# Patient Record
Sex: MALE | Race: WHITE | NOT HISPANIC OR LATINO | Employment: OTHER | URBAN - METROPOLITAN AREA
[De-identification: names, ages, dates, MRNs, and addresses within clinical notes are randomized per-mention and may not be internally consistent; named-entity substitution may affect disease eponyms.]

---

## 2021-12-01 ENCOUNTER — IMMUNIZATION (OUTPATIENT)
Dept: PRIMARY CARE CLINIC | Facility: CLINIC | Age: 66
End: 2021-12-01
Payer: MEDICARE

## 2021-12-01 DIAGNOSIS — Z23 NEED FOR VACCINATION: Primary | ICD-10-CM

## 2021-12-01 PROCEDURE — 0064A COVID-19, MRNA, LNP-S, PF, 100 MCG/0.25 ML DOSE VACCINE (MODERNA BOOSTER): CPT | Mod: S$GLB,,, | Performed by: FAMILY MEDICINE

## 2021-12-01 PROCEDURE — 91306 COVID-19, MRNA, LNP-S, PF, 100 MCG/0.25 ML DOSE VACCINE (MODERNA BOOSTER): ICD-10-PCS | Mod: S$GLB,,, | Performed by: FAMILY MEDICINE

## 2021-12-01 PROCEDURE — 91306 COVID-19, MRNA, LNP-S, PF, 100 MCG/0.25 ML DOSE VACCINE (MODERNA BOOSTER): CPT | Mod: S$GLB,,, | Performed by: FAMILY MEDICINE

## 2021-12-01 PROCEDURE — 0064A COVID-19, MRNA, LNP-S, PF, 100 MCG/0.25 ML DOSE VACCINE (MODERNA BOOSTER): ICD-10-PCS | Mod: S$GLB,,, | Performed by: FAMILY MEDICINE

## 2022-06-15 ENCOUNTER — IMMUNIZATION (OUTPATIENT)
Dept: PHARMACY | Facility: CLINIC | Age: 67
End: 2022-06-15
Payer: MEDICARE

## 2022-06-15 DIAGNOSIS — Z23 NEED FOR VACCINATION: Primary | ICD-10-CM

## 2022-10-31 ENCOUNTER — IMMUNIZATION (OUTPATIENT)
Dept: PHARMACY | Facility: CLINIC | Age: 67
End: 2022-10-31
Payer: MEDICARE

## 2022-10-31 DIAGNOSIS — Z23 NEED FOR VACCINATION: Primary | ICD-10-CM

## 2023-05-26 ENCOUNTER — IMMUNIZATION (OUTPATIENT)
Dept: PHARMACY | Facility: CLINIC | Age: 68
End: 2023-05-26
Payer: MEDICARE

## 2023-05-26 DIAGNOSIS — Z23 NEED FOR VACCINATION: Primary | ICD-10-CM

## 2024-01-24 PROBLEM — J30.9 ALLERGIC SINUSITIS: Status: ACTIVE | Noted: 2024-01-24

## 2024-01-24 PROBLEM — L30.9 ECZEMA: Status: ACTIVE | Noted: 2024-01-24

## 2024-01-24 PROBLEM — Z82.49 FAMILY HISTORY OF EARLY CAD: Status: ACTIVE | Noted: 2024-01-24

## 2024-01-24 PROBLEM — Z85.828 HISTORY OF BASAL CELL CARCINOMA (BCC) OF SKIN: Status: ACTIVE | Noted: 2024-01-24

## 2024-01-24 PROBLEM — Z86.010 PERSONAL HISTORY OF COLONIC POLYPS: Status: ACTIVE | Noted: 2024-01-24

## 2024-01-24 PROBLEM — Z86.0100 PERSONAL HISTORY OF COLONIC POLYPS: Status: ACTIVE | Noted: 2024-01-24

## 2024-01-24 PROBLEM — E78.00 HYPERCHOLESTEREMIA: Status: ACTIVE | Noted: 2024-01-24

## 2024-06-10 PROBLEM — Z82.49 FAMILY HISTORY OF EARLY CAD: Status: RESOLVED | Noted: 2024-01-24 | Resolved: 2024-06-10

## 2024-09-02 PROBLEM — J30.9 ALLERGIC SINUSITIS: Status: RESOLVED | Noted: 2024-01-24 | Resolved: 2024-09-02

## 2024-09-02 PROBLEM — Z85.828 HISTORY OF BASAL CELL CARCINOMA (BCC) OF SKIN: Status: RESOLVED | Noted: 2024-01-24 | Resolved: 2024-09-02

## 2024-09-02 PROBLEM — Z86.0100 PERSONAL HISTORY OF COLONIC POLYPS: Status: RESOLVED | Noted: 2024-01-24 | Resolved: 2024-09-02

## 2025-02-03 ENCOUNTER — CLINICAL SUPPORT (OUTPATIENT)
Dept: AUDIOLOGY | Facility: CLINIC | Age: 70
End: 2025-02-03
Payer: MEDICARE

## 2025-02-03 ENCOUNTER — OFFICE VISIT (OUTPATIENT)
Dept: OTOLARYNGOLOGY | Facility: CLINIC | Age: 70
End: 2025-02-03
Payer: MEDICARE

## 2025-02-03 VITALS
BODY MASS INDEX: 23.86 KG/M2 | WEIGHT: 161.63 LBS | DIASTOLIC BLOOD PRESSURE: 69 MMHG | SYSTOLIC BLOOD PRESSURE: 130 MMHG

## 2025-02-03 DIAGNOSIS — H93.12 TINNITUS, LEFT EAR: ICD-10-CM

## 2025-02-03 DIAGNOSIS — R42 DYSEQUILIBRIUM: Primary | ICD-10-CM

## 2025-02-03 DIAGNOSIS — H90.3 ASYMMETRICAL SENSORINEURAL HEARING LOSS: ICD-10-CM

## 2025-02-03 DIAGNOSIS — H93.8X2 SENSATION OF FULLNESS IN EAR, LEFT: ICD-10-CM

## 2025-02-03 DIAGNOSIS — H93.8X2 FULLNESS IN EAR, LEFT: Primary | ICD-10-CM

## 2025-02-03 PROCEDURE — 99999 PR PBB SHADOW E&M-EST. PATIENT-LVL III: CPT | Mod: PBBFAC,,, | Performed by: NURSE PRACTITIONER

## 2025-02-03 PROCEDURE — 92557 COMPREHENSIVE HEARING TEST: CPT | Mod: PBBFAC,PO | Performed by: AUDIOLOGIST-HEARING AID FITTER

## 2025-02-03 PROCEDURE — 99213 OFFICE O/P EST LOW 20 MIN: CPT | Mod: PBBFAC,27,PO,25 | Performed by: NURSE PRACTITIONER

## 2025-02-03 PROCEDURE — 99211 OFF/OP EST MAY X REQ PHY/QHP: CPT | Mod: PBBFAC,PO,25 | Performed by: AUDIOLOGIST-HEARING AID FITTER

## 2025-02-03 PROCEDURE — 92567 TYMPANOMETRY: CPT | Mod: PBBFAC,PO | Performed by: AUDIOLOGIST-HEARING AID FITTER

## 2025-02-03 PROCEDURE — 99999 PR PBB SHADOW E&M-EST. PATIENT-LVL I: CPT | Mod: PBBFAC,,, | Performed by: AUDIOLOGIST-HEARING AID FITTER

## 2025-02-03 PROCEDURE — 99204 OFFICE O/P NEW MOD 45 MIN: CPT | Mod: S$PBB,,, | Performed by: NURSE PRACTITIONER

## 2025-02-03 NOTE — PROGRESS NOTES
Subjective     Patient ID: José Miguel Carlos III is a 69 y.o. male.    Chief Complaint: Ear Fullness (Pt states he has always had problems clearing his ears/ making them pop) and Dizziness    Ear Fullness     Patient is new to ENT, referred by Dr. Smith for consultation for dizziness. Patient saw Dr. Smith 7 months ago for aural fullness and dizziness; given Singulair, Mucinex, and referred to ENT. Pertinent PMH includes carotid stenosis, Vit D deficiency, cataracts. Patient reports first dizzy episode occurred 3-4 years ago, and lasted approx 6 hours. Second dizzy episode occurred in June 2024, and lasted 6-8 hours. Third and most recent dizzy episode occurred in November, and lasted approx 6-8 hours. All three episodes were associated with ear fullness (not sure if just left ear or both ears were involved during the June 2024 episode, but patient is certain that only left ear fullness occurred during November episode). Patient has difficulty describing whether his dysequilibrium is true vertigo or not. When given the description of vertigo, patient unable to say whether or not he is experiencing rotational spinning sensation, only that it feels like he has to hold onto something or sit down. Patient reports some crackling noises in his left ear which he attributes to possible middle ear fluid secondary to his sinuses/allergies. Dizziness is not positional. Denies any fluctuant hearing loss during episodes of dizziness. Denies any unilateral tinnitus during dizziness. Denies any headaches or otalgia.     Review of Systems   Constitutional: Negative.    HENT: Negative.     Eyes: Negative.    Respiratory: Negative.     Cardiovascular: Negative.    Gastrointestinal: Negative.    Musculoskeletal: Negative.    Integumentary:  Negative.   Neurological:  Positive for dizziness.   Hematological: Negative.    Psychiatric/Behavioral: Negative.            Objective     Physical Exam  Vitals and nursing note reviewed.    Constitutional:       General: He is not in acute distress.     Appearance: He is well-developed. He is not ill-appearing.   HENT:      Head: Normocephalic and atraumatic.      Right Ear: Hearing, tympanic membrane, ear canal and external ear normal. No middle ear effusion. Tympanic membrane is not erythematous.      Left Ear: Hearing, tympanic membrane, ear canal and external ear normal.  No middle ear effusion. Tympanic membrane is not erythematous.      Nose: Nose normal.   Eyes:      General: Lids are normal. No scleral icterus.        Right eye: No discharge.         Left eye: No discharge.   Neck:      Trachea: Trachea normal. No tracheal deviation.   Cardiovascular:      Rate and Rhythm: Normal rate.   Pulmonary:      Effort: Pulmonary effort is normal. No respiratory distress.      Breath sounds: No stridor. No wheezing.   Musculoskeletal:         General: Normal range of motion.      Cervical back: Normal range of motion.   Skin:     General: Skin is warm and dry.   Neurological:      Mental Status: He is alert and oriented to person, place, and time.      Coordination: Coordination is intact.      Gait: Gait normal.   Psychiatric:         Attention and Perception: Attention normal.         Mood and Affect: Mood normal.         Speech: Speech normal.         Behavior: Behavior normal. Behavior is cooperative.     Negative Hardy-Hallpike AU       Assessment and Plan     1. Dysequilibrium    2. Sensation of fullness in ear, left    3. Asymmetrical sensorineural hearing loss    Recommend MRI brain with gadolinium per IAC protocol for asymmetrical sensorineural hearing loss to rule out retrocochlear pathology: acoustic schwannoma or cerebellar pontine angle mass. Patient agrees to call me if he desires to proceed with MRI.  Discussion regarding dysequilibrium versus true vertigo: VNG is an inner ear test as part of comprehensive vestibular system diagnostic testing, indicated for those patients experiencing  true vertigo but it is of little to no yield unless vertiginous. No evidence of any BPPV at this time. Whereas dysequilibrium is typically multifactorial: anemia, anxiety, dehydration, hypo-/hyper-glycemia, hypo-/hyper-tension, thyroid, arrhthymia, side effects of medication, gait/mobility issues, age, headaches and other neurological issues, etc. Discuss with your PCP to determine stages of workup. Patient agrees to call me for any vertigo or if patient desires to proceed with VNG.          No follow-ups on file.

## 2025-02-03 NOTE — PROGRESS NOTES
José Miguel Carlos III was seen on 02/03/2025 for an audiological evaluation. Pt was alone during today's visit. Pertinent complaints today include ear fullness and tinnitus AS. 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      Results reveal a normal-to-moderate sensorineural hearing loss for the right ear and  normal-to-moderately severe sensorineural hearing loss for the left ear.    Speech Reception Thresholds were  10 dBHL for the right ear and 25 dBHL for the left ear.    Word recognition scores were excellent for the right ear and good for the left ear.   Tympanograms were Type A for the right ear and Type A for the left ear.    Audiogram results were reviewed in detail with patient and all questions were answered. Results will be reviewed by the referring provider at the completion of this note. All complaints were addressed during this visit to the patient's satisfaction. Recommend further medical evaluation with an ENT provider for the asymmetry, AS>AD, left ear amplification pending medical clearance, repeat hearing testing in one year due to asymmetry and bilateral hearing protection with either muffs or in-ear protection in loud noises. Plan of care was discussed in detail with the patient, who agreed with the plan as above.

## 2025-02-04 DIAGNOSIS — Z01.812 PRE-PROCEDURE LAB EXAM: Primary | ICD-10-CM

## 2025-02-04 DIAGNOSIS — H90.42 SENSORINEURAL HEARING LOSS (SNHL) OF LEFT EAR WITH UNRESTRICTED HEARING OF RIGHT EAR: Primary | ICD-10-CM

## 2025-02-20 ENCOUNTER — HOSPITAL ENCOUNTER (OUTPATIENT)
Dept: RADIOLOGY | Facility: HOSPITAL | Age: 70
Discharge: HOME | End: 2025-02-20
Attending: NURSE PRACTITIONER
Payer: MEDICARE

## 2025-02-20 ENCOUNTER — RESULTS FOLLOW-UP (OUTPATIENT)
Dept: OTOLARYNGOLOGY | Facility: CLINIC | Age: 70
End: 2025-02-20
Payer: MEDICARE

## 2025-02-20 DIAGNOSIS — H90.42 SENSORINEURAL HEARING LOSS (SNHL) OF LEFT EAR WITH UNRESTRICTED HEARING OF RIGHT EAR: ICD-10-CM

## 2025-02-20 PROCEDURE — A9585 GADOBUTROL INJECTION: HCPCS | Mod: PO | Performed by: NURSE PRACTITIONER

## 2025-02-20 PROCEDURE — 70553 MRI BRAIN STEM W/O & W/DYE: CPT | Mod: TC,PO

## 2025-02-20 PROCEDURE — 25500020 PHARM REV CODE 255: Mod: PO | Performed by: NURSE PRACTITIONER

## 2025-02-20 RX ORDER — GADOBUTROL 604.72 MG/ML
7 INJECTION INTRAVENOUS
Status: COMPLETED | OUTPATIENT
Start: 2025-02-20 | End: 2025-02-20

## 2025-02-20 RX ADMIN — GADOBUTROL 7 ML: 604.72 INJECTION INTRAVENOUS at 02:02

## 2025-02-21 NOTE — TELEPHONE ENCOUNTER
or Della could someone reach out to this pt regarding some followup questions on hearing Aids.  Thanks

## 2025-03-05 ENCOUNTER — TELEPHONE (OUTPATIENT)
Dept: AUDIOLOGY | Facility: CLINIC | Age: 70
End: 2025-03-05
Payer: MEDICARE

## 2025-03-05 DIAGNOSIS — R42 DYSEQUILIBRIUM: Primary | ICD-10-CM

## 2025-04-10 ENCOUNTER — TELEPHONE (OUTPATIENT)
Dept: AUDIOLOGY | Facility: CLINIC | Age: 70
End: 2025-04-10
Payer: MEDICARE

## 2025-04-10 NOTE — PROGRESS NOTES
VNG EVALUATION    Referring physician:  Maria Del Rosario Griffin NP    69 y.o. male complains of dizziness that began 3-4 years ago. These last hours at a time in duration, typically 6-8 hours. He has had two other significant episodes. One occurring in June 2024 and the other occurring in November 2024. All episodes are associated with aural fullness, specifically the left ear. Mr. Carlos was unable to describe or verify when given a description if these episodes are vertigo or not. He reported that he does have to hold onto something or sit down when they occur. Episodes do not seem to be exacerbated by positional changes, but seem to be more random. Mr. Carlos experiences a crackling noise in his left ear.     VNG equipment crashed and stopped working at the beginning of the test. Testing was stopped, and Mr. Carlos was told that we'd have to reschedule when the equipment is up and running again.

## 2025-04-10 NOTE — TELEPHONE ENCOUNTER
Patient called for further instruction of his VNG testing tomorrow. Returned his call and answered all questions that he had.

## 2025-04-11 ENCOUNTER — CLINICAL SUPPORT (OUTPATIENT)
Dept: AUDIOLOGY | Facility: CLINIC | Age: 70
End: 2025-04-11
Payer: MEDICARE

## 2025-04-11 DIAGNOSIS — R42 DYSEQUILIBRIUM: Primary | ICD-10-CM

## 2025-04-11 PROCEDURE — 99499 UNLISTED E&M SERVICE: CPT | Mod: S$PBB,,,

## 2025-04-24 ENCOUNTER — PATIENT MESSAGE (OUTPATIENT)
Dept: OTOLARYNGOLOGY | Facility: CLINIC | Age: 70
End: 2025-04-24
Payer: MEDICARE

## 2025-05-02 ENCOUNTER — CLINICAL SUPPORT (OUTPATIENT)
Dept: AUDIOLOGY | Facility: CLINIC | Age: 70
End: 2025-05-02
Payer: MEDICARE

## 2025-05-02 ENCOUNTER — OFFICE VISIT (OUTPATIENT)
Dept: OTOLARYNGOLOGY | Facility: CLINIC | Age: 70
End: 2025-05-02
Payer: MEDICARE

## 2025-05-02 DIAGNOSIS — H61.23 BILATERAL IMPACTED CERUMEN: Primary | ICD-10-CM

## 2025-05-02 DIAGNOSIS — H81.8X2 UNILATERAL VESTIBULAR WEAKNESS, LEFT: Primary | ICD-10-CM

## 2025-05-02 PROCEDURE — 99212 OFFICE O/P EST SF 10 MIN: CPT | Mod: PBBFAC

## 2025-05-02 PROCEDURE — 99999 PR PBB SHADOW E&M-EST. PATIENT-LVL II: CPT | Mod: PBBFAC,,,

## 2025-05-02 NOTE — Clinical Note
Here are the results of Mr. Carlos's VNG.  Please let me know if you have any questions.   Thanks, Dae Lindsey, CCC-A

## 2025-05-02 NOTE — PROGRESS NOTES
VNG EVALUATION    Referring physician:  Maria Del Rosario Griffin NP    History:  Mr. Dukes, a 69 y.o. male, was seen today for a VNG.  Mr. Dukes complains of dizziness that began 3-4 years ago. These last hours at a time in duration, typically 6-8 hours. He has had two other significant episodes. One occurring in 2024 and the other occurring in 2024. All episodes are associated with aural fullness, specifically the left ear. Mr. Carlos was unable to describe or verify when given a description if these episodes are vertigo or not. He reported that he does have to hold onto something or sit down when they occur. Episodes do not seem to be exacerbated by positional changes, but seem to be more random. Mr. Carlos experiences a crackling noise in his left ear.     Results:  Gaze nystagmus was absent.  Oculomotor testing:  Normal latency, normal velocity, and normal accuracy on saccades.  Normal gain on pursuit.  Normal optokinetic gain.  Spontaneous nystagmus was absent.    Hardy-Hallpike:  The head-hanging left Hallpike was negative.  The head-hanging right Hallpike was negative.  Static positional testing:  Supine head center revealed <clinically insignificant> 2 d/s right-beating nystagmus.  Head right position revealed <clinically insignificant> 2 d/s right-beating and 1 d/s up-beating nystagmus.  Head left position revealed <clinically insignificant> 2 d/s left-beating nystagmus.    Bilateral bithermal caloric testing:  Unilateral weakness: 39% (left)  Directional preponderance 1% (right-beating)  RW: 31 d/s  LW: 16 d/s  RC: 21 d/s   d/s  Fixation suppression was normal.    *Patient had to be brought for ear cleaning during calorics as there was no response in the right ear for the initial right cool recording due to water and wax blocking the ear canal; response improved significantly after ear cleaning.     Impression: Abnormal VNG; Left unilateral weakness is suggestive if a left peripheral vestibular  abnormality.     Recommendations:   Formal vestibular/balance rehab  Follow-up with Maria Del Rosario Griffin NP to review the results of today's evaluation

## 2025-05-02 NOTE — PROCEDURES
Ear Cerumen Removal    Date/Time: 5/2/2025 1:40 PM    Performed by: Jyotsna Olmedo PA-C  Authorized by: Jyotsna Olmedo PA-C      Local anesthetic:  None  Location details:  Both ears  Procedure type: curette    Cerumen  Removal Results:  Cerumen completely removed  Patient tolerance:  Patient tolerated the procedure well with no immediate complications     Procedure Note:  The patient was brought to the minor procedure room and placed under the operating microscope of the right ear canal which was cleaned of ceruminous debris. Using a combination of suction, curettes and cup forceps the patient's cerumen was removed. The patient tolerated the procedure well. There were no complications.    Procedure Note:  The patient was brought to the minor procedure room and placed under the operating microscope of the left ear canal which was cleaned of ceruminous debris. Using a combination of suction, curettes and cup forceps the patient's cerumen was removed.  The patient tolerated the procedure well. There were no complications.      Patient referred from audiology for cerumen removal for pt undergoing VNG.

## 2025-05-05 ENCOUNTER — PATIENT MESSAGE (OUTPATIENT)
Dept: OTOLARYNGOLOGY | Facility: CLINIC | Age: 70
End: 2025-05-05
Payer: MEDICARE

## 2025-05-05 DIAGNOSIS — H81.92 PERIPHERAL VESTIBULOPATHY OF LEFT EAR: Primary | ICD-10-CM

## 2025-05-06 ENCOUNTER — CLINICAL SUPPORT (OUTPATIENT)
Dept: AUDIOLOGY | Facility: CLINIC | Age: 70
End: 2025-05-06
Payer: MEDICARE

## 2025-05-06 DIAGNOSIS — Z71.89 HEARING AID CONSULTATION: Primary | ICD-10-CM

## 2025-05-06 PROCEDURE — 99499 UNLISTED E&M SERVICE: CPT | Mod: S$PBB,,, | Performed by: AUDIOLOGIST-HEARING AID FITTER

## 2025-05-06 NOTE — PROGRESS NOTES
José Miguel Carlos III was seen on 05/06/2025 for a hearing aid consultation. Pt was alone during today's visit. He has some experience with hearing aids as his sister wears a hearing aid in her right ear. Patient's audiogram was discussed in detail. We also discussed the different brands, styles and levels of technology. It was decided based on the patients lifestyle that the best choice would be ITEMIZED Oticon Intent 2 or 3 miniRITE R in color 90 with size 3-85 receivers AU. The price was quoted was with tax for 1 aid. Pt will pay the $250.00 non refundable fitting fee over the phone once he places the order and will pay the remaining balance for the hearing aids at time of fitting. Pt was also informed that insurance reimbursement by their insurance company for any hearing aid benefits would need to be filed for by the patient since [facility] does not file for insurance benefits for hearing aids at this time. The pt will call his insurance to determine any potential benefits and call the clinic if he wishes to place the order. Plan of care was discussed in detail with the patient, who agreed with the plan as above. All complaints were addressed during this visit to the patient's satisfaction.

## 2025-05-14 ENCOUNTER — CLINICAL SUPPORT (OUTPATIENT)
Dept: REHABILITATION | Facility: HOSPITAL | Age: 70
End: 2025-05-14
Payer: MEDICARE

## 2025-05-14 DIAGNOSIS — H81.92 PERIPHERAL VESTIBULOPATHY OF LEFT EAR: ICD-10-CM

## 2025-05-14 PROCEDURE — 97161 PT EVAL LOW COMPLEX 20 MIN: CPT | Mod: PO

## 2025-05-14 PROCEDURE — 97530 THERAPEUTIC ACTIVITIES: CPT | Mod: PO

## 2025-05-14 NOTE — PROGRESS NOTES
Outpatient Rehab    Physical Therapy Evaluation    Patient Name: Camden Carlos III  MRN: [id number]  YOB: 1955  Encounter Date: 5/14/2025    Therapy Diagnosis:   Encounter Diagnosis   Name Primary?    Peripheral vestibulopathy of left ear      Physician: Maria Del Rosario Griffin NP    Physician Orders: Eval and Treat  Medical Diagnosis: Peripheral vestibulopathy of left ear    Visit # / Visits Authorized:  1 / 1  Insurance Authorization Period: 5/5/2025 to 12/31/2025  Date of Evaluation: 5/14/2025  Plan of Care Certification: 5/14/2025 to 6/11/2025     Time In: 0900   Time Out: 0952  Total Time (in minutes): 52   Total Billable Time (in minutes): 52    Intake Outcome Measure for FOTO Survey    Therapist reviewed FOTO scores for Camden Carlos III on 5/14/2025.   FOTO report - see Media section or FOTO account episode details.     Intake Score: 67%    Precautions:       Subjective   History of Present Illness  Camden is a 69 y.o. male      The patient reports a medical diagnosis of H81.92 (ICD-10-CM) - Peripheral vestibulopathy of left ear.    Diagnostic tests related to this condition: Videonystagmography (VNG).        History of Present Condition/Illness: Patient reports that he's had two events of dizziness that have been short lived. States that he's had several tests that showed that he has some inner ear weakness on the left side. He states that he does not feel that he has a balance issues generally outside of the couple of dizzy spells that he has had. He states that his most recent dizzy spell was in November 2024 which lasted 4-6 hours. He states that the sensation he felt was unsteadiness, spinning, nausea. The initial event was in June 2024. Unable to remember a causal mechanism for his symptoms. He does report aural fullness on his left side, along with some hearing loss.     Pain  No Pain Reported: Yes                 Review of Systems  Patient reports: Nausea, Cancer History, Hearing Loss,  Imbalance, Vertigo, and Aural Fullness  Patient denies: Weight Loss, Cardiac History, Diabetes, and Light-Headedness        Treatment History  Treatments  Previously Received Treatments: No    Living Arrangements  Home Setup  Number of Levels in Home: One level        Employment  Employment Status: Retired          Past Medical History/Physical Systems Review:   José Miguel Carlos III  has a past medical history of a Family H/O Early CAD, a Mild Bilateral Carotid Artery Stenosis, c Hypercholesteremia With Low HDL, j H/O Colon Polyps On 7/17/19 Colonoscopy, o Allergic Rhinosinusitis, p OU Cataracts, q Borderline Vitamin D Deficiency, q Eczema, q H/O Cheek Basal Cell Carcinoma Excision, and Wellness examination 1/23/2025.    José Miguel Carlos III  has a past surgical history that includes [place] tooth extraction; dental implant (01/2016); Colonoscopy (07/17/2019); and colonscopy  (12/11/2024).    José Miguel has a current medication list which includes the following prescription(s): cholecalciferol (vitamin d3), montelukast, and rosuvastatin.    Review of patient's allergies indicates:  No Known Allergies     Objective   Ocular Structure and Alignment  Right Ocular Alignment: Intact  Left Ocular Alignment: Intact  Pupillary Symmetry: Symmetric  Head Position: Neutral       Ocular Movement  Static Visual Fixation: Intact  Convergence: Intact  Right Eye Ocular Range of Motion: Intact  Left Eye Ocular Range of Motion: Intact  Pursuits: Smooth and accurate  Horizontal Saccades: Intact  Vertical Saccades: Intact  Left beating nystagmus with end range ocular movement to L        Subcranial Range of Motion   Active Restricted? Passive Restricted? Pain   Flexion         Protraction         Retraction           Cervical Range of Motion   Active (deg) Passive (deg) Pain   Flexion 50       Extension 45       Right Lateral Flexion         Right Rotation 60       Left Lateral Flexion         Left Rotation 60                    Vestibulo-Ocular Reflex (VOR) Tests  Positive Head Thrust Impulse Test: Right and Left            Nystagmus and Associated Symptom Provocative Tests  Positive: End-Point Nystagmus  Negative: Gaze-Evoked Nystagmus, Head Shake Nystagmus, and Spontaneous Nystagmus  End-Point Nystagmus Pattern: Left beat    Vestibular Positional and Balance Testing       Balance Tests  Negative: Right Fukuda Stepping Test and Left Fukuda Stepping Test  Fukuda Stepping Test Details: Slight R rotation, but not enough for positive test  Modified Clinical Test of Sensory Interaction and Balance (CTSIB-M): All conditions 30 seconds with no falls              Fall Risk  Functional mobility test results suggest the patient is not: At Risk for Falls  Four Stage Balance Test  Narrow Base of Support: 10 sec sec  Tandem Stand - Right Foot in Front: 10 sec  Tandem Stand - Left Foot in Front: 10 sec        Single Leg Stand - Right Foot: 10 sec  Single Leg Stand - Left Foot: 10 sec           Ambulation Details    FGA 30/30    Gait Analysis  Base of Support: Normal            Gait Analysis Details  No deficits noted.         Treatment:  Balance/Neuromuscular Re-Education  NMR 1: NV: HEP, balance with EC on foam pad, ball taps floor to ceiling, balls tosses with head turns  Therapeutic Activity  TA 1: Patient was educated on exam findings from today's evaluation, subsystems of balance, ways ear weakness can contribute to balance deficits, and methods of grounding during exacerbations of his symptoms  TA 2: Pt was given an HEP consisting of VORx1 and VORx2, as well as static standing balance with head turns    Time Entry(in minutes):  PT Evaluation (Low) Time Entry: 40  Therapeutic Activity Time Entry: 12    Assessment & Plan   Assessment  Camden presents with a condition of Low complexity.   Presentation of Symptoms: Stable  Will Comorbidities Impact Care: No       Functional Limitations: Sensory processing, Visual impairments  Impairments: Impaired  balance, Lack of appropriate home exercise program    Patient Goal for Therapy (PT): Learn exercises and grounding techniques  Prognosis: Good  Assessment Details: Patient presents today to outpatient physical therapy services for initial evaluation of his vestibular hypofunction. He presents today with complaint of rare episodes of vertigo that lasts for a few hours at a time. Most recent episode was back in November of 2024. He presents with today nystagmus present during end of ocular range of motion to left, as well as deficits in his VOR bilaterally. Unable to recreate his symptoms during today's visit. His static and dynamic balance seems to be intact per FGA and modified CTSIB. We will trial physical therapy for a few weeks to educate patient on exercises to perform outside of physical therapy, as well as reinforce grounding techniques discussed during today's visit to facilitate reductions in intensity and duration of his vertigo spells that seem to be rare.    Plan  From a physical therapy perspective, the patient would benefit from: Skilled Rehab Services    Planned therapy interventions include: Therapeutic exercise, Therapeutic activities, Neuromuscular re-education, and Manual therapy.            Visit Frequency: 1 times Per Week for 4 Weeks.       This plan was discussed with Patient.   Discussion participants: Agreed Upon Plan of Care  Plan details: May discharge early due to going out of town          Patient's spiritual, cultural, and educational needs considered and patient agreeable to plan of care and goals.     Education  Education was done with Patient. The patient's learning style includes Listening. The patient Verbalizes understanding.         Patient was educated on exam findings from today's evaluation, subsystems of balance, ways ear weakness can contribute to balance deficits, and methods of grounding during exacerbations of his symptoms       Goals:   Active       LTGs       HEP        Start:  05/14/25    Expected End:  06/11/25       Patient will be independent and compliant with his HEP to impact his progress towards his goals         Grounding       Start:  05/14/25    Expected End:  06/11/25       Patient will be able to recite and recall grounding strategies without assistance to impact his ability to manage his vertigo episodes when they present         Head Impulse Test       Start:  05/14/25    Expected End:  06/11/25       Patient will present with a negative head impulse test bilaterally to demonstrate improvements in vestibular functioning             Hu Begum, PT

## 2025-05-16 ENCOUNTER — PATIENT MESSAGE (OUTPATIENT)
Dept: AUDIOLOGY | Facility: CLINIC | Age: 70
End: 2025-05-16
Payer: MEDICARE

## 2025-05-19 ENCOUNTER — PATIENT MESSAGE (OUTPATIENT)
Dept: OTOLARYNGOLOGY | Facility: CLINIC | Age: 70
End: 2025-05-19
Payer: MEDICARE

## 2025-05-19 ENCOUNTER — TELEPHONE (OUTPATIENT)
Dept: AUDIOLOGY | Facility: CLINIC | Age: 70
End: 2025-05-19
Payer: MEDICARE

## 2025-05-19 NOTE — TELEPHONE ENCOUNTER
Pt had questions about preauthorization for hearing aids with [facility] federal. Xplained that we do not provide preauthorization. Answered other questions he had to his satisfaction. He will contact his insurance and call the clinic if he wishes to place the order.

## 2025-05-20 ENCOUNTER — CLINICAL SUPPORT (OUTPATIENT)
Dept: REHABILITATION | Facility: HOSPITAL | Age: 70
End: 2025-05-20
Payer: MEDICARE

## 2025-05-20 DIAGNOSIS — H81.92 PERIPHERAL VESTIBULOPATHY OF LEFT EAR: Primary | ICD-10-CM

## 2025-05-20 PROCEDURE — 97112 NEUROMUSCULAR REEDUCATION: CPT | Mod: PO

## 2025-05-20 NOTE — PROGRESS NOTES
Outpatient Rehab    Physical Therapy Visit    Patient Name: Camden Carlos III  MRN: [id number]  YOB: 1955  Encounter Date: 5/20/2025    Therapy Diagnosis:   Encounter Diagnosis   Name Primary?    Peripheral vestibulopathy of left ear Yes     Physician: Maria Del Rosario Griffin NP    Physician Orders: Eval and Treat  Medical Diagnosis: Peripheral vestibulopathy of left ear    Visit # / Visits Authorized:  1 / 10  Insurance Authorization Period: 5/14/2025 to 12/31/2025  Date of Evaluation: 5/14/2025  Plan of Care Certification: 5/14/2025 to 6/11/2025      PT/PTA:     Number of PTA visits since last PT visit:   Time In: 0906   Time Out: 0937  Total Time (in minutes): 31   Total Billable Time (in minutes): 31    FOTO:  Intake Score:  %  Survey Score 2:  %  Survey Score 3:  %    Precautions:       Subjective   Pt reports that he's been doing well, no episodes to report. Has been doing his HEP..         Objective            Treatment:  Balance/Neuromuscular Re-Education  NMR 2: VOR x1 horizontal and vertical 2x 1:00  NMR 3: VOR x2 horizontal and vertical 2x 1:00  NMR 4: Standing balance with EC on foam pad 2x1:00  NMR 5: Ball taps floor to ceiling 3x12  NMR 6: Ball tosses with quick head turns 2x 30 leeroy    Time Entry(in minutes):  Neuromuscular Re-Education Time Entry: 31    Assessment & Plan   Assessment: Pt with good tolerance to all exercises perfomed this visit focusing on improving peripheral vestibular function through oculomotor tasks and balance tasks with head turns. Goal of physical therapy continues to be to teach symptom management and reduce chances of vesibular attacks occuring in the future. Will continue to progress as tolerated.  Evaluation/Treatment Tolerance: Patient tolerated treatment well    Patient will continue to benefit from skilled outpatient physical therapy to address the deficits listed in the problem list box on initial evaluation, provide pt/family education and to maximize pt's  level of independence in the home and community environment.     Patient's spiritual, cultural, and educational needs considered and patient agreeable to plan of care and goals.           Plan: Continue 1x/wk per initial POC.    Goals:   Active       LTGs       HEP       Start:  05/14/25    Expected End:  06/11/25       Patient will be independent and compliant with his HEP to impact his progress towards his goals         Grounding       Start:  05/14/25    Expected End:  06/11/25       Patient will be able to recite and recall grounding strategies without assistance to impact his ability to manage his vertigo episodes when they present         Head Impulse Test       Start:  05/14/25    Expected End:  06/11/25       Patient will present with a negative head impulse test bilaterally to demonstrate improvements in vestibular functioning             Hu Begum, PT

## 2025-05-27 ENCOUNTER — CLINICAL SUPPORT (OUTPATIENT)
Dept: REHABILITATION | Facility: HOSPITAL | Age: 70
End: 2025-05-27
Payer: MEDICARE

## 2025-05-27 DIAGNOSIS — H81.92 PERIPHERAL VESTIBULOPATHY OF LEFT EAR: Primary | ICD-10-CM

## 2025-05-27 PROCEDURE — 97112 NEUROMUSCULAR REEDUCATION: CPT | Mod: PO

## 2025-05-27 NOTE — PROGRESS NOTES
Outpatient Rehab    Physical Therapy Visit    Patient Name: Camden Carlos III  MRN: [id number]  YOB: 1955  Encounter Date: 5/27/2025    Therapy Diagnosis:   Encounter Diagnosis   Name Primary?    Peripheral vestibulopathy of left ear Yes     Physician: Maria Del Rosario Griffin NP    Physician Orders: Eval and Treat  Medical Diagnosis: Peripheral vestibulopathy of left ear    Visit # / Visits Authorized:  2 / 10  Insurance Authorization Period: 5/14/2025 to 12/31/2025  Date of Evaluation: 5/14/2025  Plan of Care Certification: 5/14/2025 to 6/11/2025      PT/PTA:     Number of PTA visits since last PT visit:   Time In: 0900   Time Out: 0919  Total Time (in minutes): 19   Total Billable Time (in minutes): 19    FOTO:  Intake Score:  %  Survey Score 2:  %  Survey Score 3:  %    Precautions:       Subjective   Pt reports he's feeling well this morning, no episodes to report.         Objective            Treatment:  Balance/Neuromuscular Re-Education  NMR 2: VOR x1 horizontal and vertical 2x 1:00  NMR 3: VOR x2 horizontal and vertical 2x 1:00  NMR 4: Standing balance with EC on foam pad 2x1:00  NMR 5: Ball taps floor to ceiling 3x12    Time Entry(in minutes):  Neuromuscular Re-Education Time Entry: 19    Assessment & Plan   Assessment: Pt was able to tolerate all of his exercises. Focus of POC continues to be improving vestibular function. Tried first VOR set at 100 bpm, but pt couldn't match desired frequency. Remainder of VOR exercise performed without metronome. All other exercises were performed without difficulty. Pt will be reassessed next visit in order to possibly discharge in accordance with POC.  Evaluation/Treatment Tolerance: Patient tolerated treatment well    The patient will continue to benefit from skilled outpatient physical therapy in order to address the deficits listed in the problem list on the initial evaluation, provide patient and family education, and maximize the patients level of  independence in the home and community environments.     The patient's spiritual, cultural, and educational needs were considered, and the patient is agreeable to the plan of care and goals.           Plan: Continue 1x/wk per initial POC. Possible discharge next visit.    Goals:   Active       LTGs       HEP       Start:  05/14/25    Expected End:  06/11/25       Patient will be independent and compliant with his HEP to impact his progress towards his goals         Grounding       Start:  05/14/25    Expected End:  06/11/25       Patient will be able to recite and recall grounding strategies without assistance to impact his ability to manage his vertigo episodes when they present         Head Impulse Test       Start:  05/14/25    Expected End:  06/11/25       Patient will present with a negative head impulse test bilaterally to demonstrate improvements in vestibular functioning             Hu Begum, PT

## 2025-05-29 ENCOUNTER — TELEPHONE (OUTPATIENT)
Dept: AUDIOLOGY | Facility: CLINIC | Age: 70
End: 2025-05-29
Payer: MEDICARE

## 2025-05-29 NOTE — TELEPHONE ENCOUNTER
LMOR for pt to call with his questions    ----- Message from Della John sent at 5/29/2025 11:56 AM CDT -----  Regarding: Requesting call back  The patient is requesting a call back about some follow up questions he has about hearing aids. [phone]

## 2025-06-03 ENCOUNTER — CLINICAL SUPPORT (OUTPATIENT)
Dept: REHABILITATION | Facility: HOSPITAL | Age: 70
End: 2025-06-03
Payer: MEDICARE

## 2025-06-03 DIAGNOSIS — H81.92 PERIPHERAL VESTIBULOPATHY OF LEFT EAR: Primary | ICD-10-CM

## 2025-06-03 PROCEDURE — 97112 NEUROMUSCULAR REEDUCATION: CPT | Mod: PO

## 2025-06-03 PROCEDURE — 97530 THERAPEUTIC ACTIVITIES: CPT | Mod: PO
